# Patient Record
(demographics unavailable — no encounter records)

---

## 2025-03-17 NOTE — HISTORY OF PRESENT ILLNESS
[FreeTextEntry1] : 60yo F diabetes, obesity in the office for evaluation  History: chronic back and neck pain more than one year of ongoing symptoms lower back predominant daily same intensity no radiation some degree of neck discomfort pain management follow up blocks and epidural injection, clinical benefit described MRI DJD with disc herniations and foraminal stenosis  DM: history of uncontrolled DM A1C up to 15% now on intense regimen last A1C under 6.5% had bariatric surgery for diabetes and obesity, 100+ pounds of weight loss

## 2025-03-17 NOTE — PHYSICAL EXAM
[General Appearance - Alert] : alert [Sclera] : the sclera and conjunctiva were normal [Neck Appearance] : the appearance of the neck was normal [Auscultation Breath Sounds / Voice Sounds] : lungs were clear to auscultation bilaterally [Heart Rate And Rhythm] : heart rate was normal and rhythm regular [Heart Sounds] : normal S1 and S2 [Musculoskeletal - Swelling] : no joint swelling seen [Motor Tone] : muscle strength and tone were normal [FreeTextEntry1] : pain with extension and flexion of lower ext on lower back [] : no rash [No Focal Deficits] : no focal deficits [Oriented To Time, Place, And Person] : oriented to person, place, and time